# Patient Record
Sex: MALE | Race: BLACK OR AFRICAN AMERICAN
[De-identification: names, ages, dates, MRNs, and addresses within clinical notes are randomized per-mention and may not be internally consistent; named-entity substitution may affect disease eponyms.]

---

## 2021-11-05 ENCOUNTER — DASHBOARD ENCOUNTERS (OUTPATIENT)
Age: 41
End: 2021-11-05

## 2021-11-05 ENCOUNTER — WEB ENCOUNTER (OUTPATIENT)
Dept: URBAN - METROPOLITAN AREA CLINIC 54 | Facility: CLINIC | Age: 41
End: 2021-11-05

## 2021-11-05 ENCOUNTER — OFFICE VISIT (OUTPATIENT)
Dept: URBAN - METROPOLITAN AREA CLINIC 54 | Facility: CLINIC | Age: 41
End: 2021-11-05
Payer: OTHER GOVERNMENT

## 2021-11-05 VITALS
TEMPERATURE: 97.2 F | BODY MASS INDEX: 31.02 KG/M2 | WEIGHT: 229 LBS | SYSTOLIC BLOOD PRESSURE: 179 MMHG | DIASTOLIC BLOOD PRESSURE: 94 MMHG | HEART RATE: 80 BPM | HEIGHT: 72 IN

## 2021-11-05 DIAGNOSIS — R10.84 ABDOMINAL CRAMPING, GENERALIZED: ICD-10-CM

## 2021-11-05 PROCEDURE — 99203 OFFICE O/P NEW LOW 30 MIN: CPT | Performed by: STUDENT IN AN ORGANIZED HEALTH CARE EDUCATION/TRAINING PROGRAM

## 2021-11-05 RX ORDER — TIZANIDINE 4 MG/1
1 TABLET TABLET ORAL THREE TIMES A DAY
Status: ACTIVE | COMMUNITY

## 2021-11-05 RX ORDER — QUETIAPINE 100 MG/1
1 TABLET AT BEDTIME TABLET, FILM COATED ORAL
Status: ACTIVE | COMMUNITY

## 2021-11-05 RX ORDER — OXYCODONE HYDROCHLORIDE AND ACETAMINOPHEN 10; 325 MG/1; MG/1
1 TABLET AS NEEDED TABLET ORAL
Status: ACTIVE | COMMUNITY

## 2021-11-05 RX ORDER — AMLODIPINE BESYLATE 5 MG/1
1 TABLET TABLET ORAL TWICE DAILY
Status: ACTIVE | COMMUNITY

## 2021-11-05 RX ORDER — LIDOCAINE 140 MG/1
1 PATCH REMOVE AFTER 12 HOURS PATCH CUTANEOUS ONCE A DAY
Status: ACTIVE | COMMUNITY

## 2021-11-05 RX ORDER — METFORMIN HYDROCHLORIDE 500 MG/1
1 TABLET WITH A MEAL TABLET, FILM COATED ORAL
Status: ACTIVE | COMMUNITY

## 2021-11-05 RX ORDER — FENTANYL 25 UG/H
1 PATCH TO SKIN PATCH TRANSDERMAL
Status: ACTIVE | COMMUNITY

## 2021-11-05 NOTE — HPI-TODAY'S VISIT:
Mr. Hassan is a pleasant 41-year-old man with a history of diabetes mellitus, anxiety and lower back pain (s/p spinal fusion L5-S1, and spinal stimulator) who was referred by Dr. Fredo Peacock for evaluation and management of abdominal pain.  Patient has had left-sided flank pain since mid October.  He describes it as left flank/lower left chest wall pain that wraps around to his back. It is a general soreness with intermittent sharp pains. It is aggravated by angling his torso towards his left. He also notices sleeping on his left side can aggravate some of the discomfort. Pain is not related to eating food or defecation.  Denies any associated nausea or vomiting. He cannot pin point any inciting event. He had an emergency room evaluation which included a CT Abdomen. He was told that the result was unremarkable. Use of Dicyclomine, Pepcid or stool softeners has not improved pain. Applying warmth to the area has improved the pain. Percocet is also helpful.

## 2021-11-09 ENCOUNTER — TELEPHONE ENCOUNTER (OUTPATIENT)
Dept: URBAN - METROPOLITAN AREA CLINIC 54 | Facility: CLINIC | Age: 41
End: 2021-11-09